# Patient Record
Sex: MALE | Race: WHITE
[De-identification: names, ages, dates, MRNs, and addresses within clinical notes are randomized per-mention and may not be internally consistent; named-entity substitution may affect disease eponyms.]

---

## 2023-10-12 ENCOUNTER — HOSPITAL ENCOUNTER (OUTPATIENT)
Dept: HOSPITAL 33 - ED | Age: 73
Setting detail: OBSERVATION
LOS: 1 days | Discharge: HOME | End: 2023-10-13
Attending: INTERNAL MEDICINE | Admitting: INTERNAL MEDICINE
Payer: MEDICARE

## 2023-10-12 DIAGNOSIS — N40.0: ICD-10-CM

## 2023-10-12 DIAGNOSIS — I25.10: ICD-10-CM

## 2023-10-12 DIAGNOSIS — K50.90: ICD-10-CM

## 2023-10-12 DIAGNOSIS — I10: ICD-10-CM

## 2023-10-12 DIAGNOSIS — Z20.828: ICD-10-CM

## 2023-10-12 DIAGNOSIS — E11.9: ICD-10-CM

## 2023-10-12 DIAGNOSIS — Z79.899: ICD-10-CM

## 2023-10-12 DIAGNOSIS — R10.9: ICD-10-CM

## 2023-10-12 DIAGNOSIS — E83.52: ICD-10-CM

## 2023-10-12 DIAGNOSIS — K56.609: Primary | ICD-10-CM

## 2023-10-12 DIAGNOSIS — I25.2: ICD-10-CM

## 2023-10-12 DIAGNOSIS — R74.01: ICD-10-CM

## 2023-10-12 LAB
ALBUMIN SERPL-MCNC: 4.5 G/DL (ref 3.5–5)
ALBUMIN SERPL-MCNC: 4.9 G/DL (ref 3.5–5)
ALP SERPL-CCNC: 59 U/L (ref 38–126)
ALP SERPL-CCNC: 69 U/L (ref 38–126)
ALT SERPL-CCNC: 57 U/L (ref 0–50)
ALT SERPL-CCNC: 68 U/L (ref 0–50)
ANION GAP SERPL CALC-SCNC: 14.4 MEQ/L (ref 5–15)
ANION GAP SERPL CALC-SCNC: 16 MEQ/L (ref 5–15)
AST SERPL QL: 48 U/L (ref 17–59)
AST SERPL QL: 78 U/L (ref 17–59)
BACTERIA UR CULT: NO
BASOPHILS # BLD AUTO: 0.03 X10^3/UL (ref 0–0.4)
BASOPHILS NFR BLD AUTO: 0.3 % (ref 0–0.4)
BILIRUB BLD-MCNC: 1.8 MG/DL (ref 0.2–1.3)
BILIRUB BLD-MCNC: 2 MG/DL (ref 0.2–1.3)
BUN SERPL-MCNC: 19 MG/DL (ref 9–20)
BUN SERPL-MCNC: 20 MG/DL (ref 9–20)
CALCIUM SPEC-MCNC: 10.5 MG/DL (ref 8.4–10.2)
CALCIUM SPEC-MCNC: 11.2 MG/DL (ref 8.4–10.2)
CHLORIDE SERPL-SCNC: 101 MMOL/L (ref 98–107)
CHLORIDE SERPL-SCNC: 101 MMOL/L (ref 98–107)
CO2 SERPL-SCNC: 26 MMOL/L (ref 22–30)
CO2 SERPL-SCNC: 29 MMOL/L (ref 22–30)
CREAT SERPL-MCNC: 0.75 MG/DL (ref 0.66–1.25)
CREAT SERPL-MCNC: 0.84 MG/DL (ref 0.66–1.25)
EOSINOPHIL # BLD AUTO: 0.01 X10^3/UL (ref 0–0.5)
GFR SERPLBLD BASED ON 1.73 SQ M-ARVRAT: > 60 ML/MIN
GFR SERPLBLD BASED ON 1.73 SQ M-ARVRAT: > 60 ML/MIN
GLUCOSE SERPL-MCNC: 152 MG/DL (ref 74–106)
GLUCOSE SERPL-MCNC: 198 MG/DL (ref 74–106)
GLUCOSE UR-MCNC: >=1000 MG/DL
HCT VFR BLD AUTO: 47.6 % (ref 42–50)
HCT VFR BLD AUTO: 49.6 % (ref 42–50)
HGB BLD-MCNC: 14.9 G/DL (ref 12.5–18)
HGB BLD-MCNC: 15.6 G/DL (ref 12.5–18)
IMM GRANULOCYTES # BLD: 0.05 X10^3U/L (ref 0–0.03)
IMM GRANULOCYTES NFR BLD: 0.5 % (ref 0–0.4)
LIPASE SERPL-CCNC: 60 U/L (ref 23–300)
LYMPHOCYTES # SPEC AUTO: 0.62 X10^3/UL (ref 1–4.6)
MCH RBC QN AUTO: 32.2 PG (ref 26–32)
MCH RBC QN AUTO: 32.4 PG (ref 26–32)
MCHC RBC AUTO-ENTMCNC: 31.3 G/DL (ref 32–36)
MCHC RBC AUTO-ENTMCNC: 31.5 G/DL (ref 32–36)
MONOCYTES # BLD AUTO: 0.74 X10^3/UL (ref 0–1.3)
NRBC # BLD AUTO: 0 X10^3U/L (ref 0–0.01)
NRBC BLD AUTO-RTO: 0 % (ref 0–0.1)
PLATELET # BLD AUTO: 260 X10^3/UL (ref 150–450)
PLATELET # BLD AUTO: 304 X10^3/UL (ref 150–450)
POTASSIUM SERPLBLD-SCNC: 4.3 MMOL/L (ref 3.5–5.1)
POTASSIUM SERPLBLD-SCNC: 4.3 MMOL/L (ref 3.5–5.1)
PROT SERPL-MCNC: 7.5 G/DL (ref 6.3–8.2)
PROT SERPL-MCNC: 8.1 G/DL (ref 6.3–8.2)
RBC # BLD AUTO: 4.6 X10^6/UL (ref 4.1–5.6)
RBC # BLD AUTO: 4.84 X10^6/UL (ref 4.1–5.6)
RBC # URNS HPF: (no result) /HPF (ref 0–5)
SODIUM SERPL-SCNC: 139 MMOL/L (ref 137–145)
SODIUM SERPL-SCNC: 141 MMOL/L (ref 137–145)
WBC # BLD AUTO: 10 X10^3/UL (ref 4–10.5)
WBC # BLD AUTO: 8.6 X10^3/UL (ref 4–10.5)
WBC URNS QL MICRO: (no result) /HPF (ref 0–5)

## 2023-10-12 PROCEDURE — 71045 X-RAY EXAM CHEST 1 VIEW: CPT

## 2023-10-12 PROCEDURE — 36415 COLL VENOUS BLD VENIPUNCTURE: CPT

## 2023-10-12 PROCEDURE — 82947 ASSAY GLUCOSE BLOOD QUANT: CPT

## 2023-10-12 PROCEDURE — 84484 ASSAY OF TROPONIN QUANT: CPT

## 2023-10-12 PROCEDURE — 83690 ASSAY OF LIPASE: CPT

## 2023-10-12 PROCEDURE — 99285 EMERGENCY DEPT VISIT HI MDM: CPT

## 2023-10-12 PROCEDURE — 83036 HEMOGLOBIN GLYCOSYLATED A1C: CPT

## 2023-10-12 PROCEDURE — 85027 COMPLETE CBC AUTOMATED: CPT

## 2023-10-12 PROCEDURE — 94762 N-INVAS EAR/PLS OXIMTRY CONT: CPT

## 2023-10-12 PROCEDURE — 74176 CT ABD & PELVIS W/O CONTRAST: CPT

## 2023-10-12 PROCEDURE — 36000 PLACE NEEDLE IN VEIN: CPT

## 2023-10-12 PROCEDURE — 96374 THER/PROPH/DIAG INJ IV PUSH: CPT

## 2023-10-12 PROCEDURE — 80053 COMPREHEN METABOLIC PANEL: CPT

## 2023-10-12 PROCEDURE — 85025 COMPLETE CBC W/AUTO DIFF WBC: CPT

## 2023-10-12 PROCEDURE — 93268 ECG RECORD/REVIEW: CPT

## 2023-10-12 PROCEDURE — 96375 TX/PRO/DX INJ NEW DRUG ADDON: CPT

## 2023-10-12 PROCEDURE — 81001 URINALYSIS AUTO W/SCOPE: CPT

## 2023-10-12 PROCEDURE — G0378 HOSPITAL OBSERVATION PER HR: HCPCS

## 2023-10-12 RX ADMIN — MORPHINE SULFATE PRN MG: 4 INJECTION, SOLUTION INTRAMUSCULAR; INTRAVENOUS at 14:46

## 2023-10-12 RX ADMIN — HYDROMORPHONE HYDROCHLORIDE PRN MG: 1 INJECTION, SOLUTION INTRAMUSCULAR; INTRAVENOUS; SUBCUTANEOUS at 22:40

## 2023-10-12 RX ADMIN — DEXTROSE AND SODIUM CHLORIDE SCH MLS/HR: 5; 450 INJECTION, SOLUTION INTRAVENOUS at 15:39

## 2023-10-12 RX ADMIN — PROCHLORPERAZINE EDISYLATE PRN MG: 5 INJECTION INTRAMUSCULAR; INTRAVENOUS at 17:24

## 2023-10-12 RX ADMIN — MORPHINE SULFATE PRN MG: 4 INJECTION, SOLUTION INTRAMUSCULAR; INTRAVENOUS at 10:40

## 2023-10-12 RX ADMIN — DEXTROSE AND SODIUM CHLORIDE SCH MLS/HR: 5; 450 INJECTION, SOLUTION INTRAVENOUS at 06:30

## 2023-10-12 RX ADMIN — MORPHINE SULFATE PRN MG: 4 INJECTION, SOLUTION INTRAMUSCULAR; INTRAVENOUS at 06:27

## 2023-10-12 RX ADMIN — HYDROMORPHONE HYDROCHLORIDE PRN MG: 1 INJECTION, SOLUTION INTRAMUSCULAR; INTRAVENOUS; SUBCUTANEOUS at 17:24

## 2023-10-12 NOTE — XRAY
CLINICAL HISTORY:pain

COMPARISON:None

TECHNIQUE:Multiple axial sections of CT scan of the abdomen and pelvis were

carried out without non-ionic IV contrast enhancement. Coronal and Sagittal

reformations were also acquired.Total .7mGy-cm CTDl 16.9mGY

FINDINGS:

The jejunal and ileal loops appear dilated maximum [id number] caliber of one

loop measures 3.9cm. The dilated loops appear fecal loaded giving small bowel

feces signs.

There is a possible abrupt transition noted in the distal ileal loops in the

right lower abdomen. There is mild adjacent mesenteric fat stranding along

with increased vascularity noted.

No free fluid seen

No evidence of pneumoperitoneum.

The visulaized bowel loop appears unremarkable. Surgical sutures in the bowel

loops in the right lower quadrant.



Overall imaging findings are Suggestive of acute/ subacute intestinal

obstruction.





Liver is normal in size. No focal mass seen. Intra and extra-hepatic biliary

ducts are not dilated. Few calcified Granuloma noted in the Caudate lobe

Gallbladder appears normal. No evidence of radio-opaque calculus, wall

thickening or pericholecystic fluid.

The spleen appears normal. No evidence of focal mass was seen.

Pancreas appears normal. No evidence of mass lesion was noted.

Both adrenal glands appear normal. No discrete lesion was noted.

Both kidneys are normal in size. No evidence of focal mass, calculus or

hydronephrosis was noted.

Exophytic simple cortical cyst noted in both kidneys, on the right side it

measures 3.8 x 2.9cm and on left it measures 1.3 x 0.8cm. Possible parapelvic

cysts in the left kidney.

The urinary bladder is smooth in outline, without evidence of wall thickening,

vesical calculus or intraluminal mass.

Pelvic viscera appear normal.



No significant lymphadenopathy was seen.

Atheromatous changes noted in aorta and its branches

On appropriate lung window settings, There is ground-glass haze with prominent

reticulation noted in the right lung base

On bone window settings, Degenerative changes noted in the spine With

multilevel osteophytes and vacuum phenomena

IMPRESSION:

1. Overall imaging findings are suggestive of acute/subacute small bowel

obstruction with a possible transition point noted in the distal ileal loops

in the right lower abdomen associated with mild adjacent mesenteric fat

extending and increased vascularity. Would recommend clinical correlation

2. No evidence of ascites or pneumoperitoneum.

The [facility] ER office was called at [phone] at

03:19 AM CST, 10/12/2023 and the results were verbally communicated with Sharifa

_____________________________________

Electronically Signed by: Dasia Wallace MD. (10/12/2023 03:28:05 CST)

## 2023-10-12 NOTE — ERPHSYRPT
- History of Present Illness


Time Seen by Provider: 10/12/23 03:10


Historian: patient


Exam Limitations: no limitations


Patient Subjective Stated Complaint: pt states crohns flare up


Triage Nursing Assessment: pt ambulated into the er; pt is axo x4; c/o abd pain;

pt is yelling out; pt is holding abd; garding abd; tenderness to abd; abd is 

round, distended; active bowel sounds in all quads; last BM 10/11/23; c/o 

vomiting; skin PDW; no respiratory distress; hypertension


Physician History: 


Patient is a 73-year-old male presents to our emergency department for 

evaluation of abdominal pain.  Patient has a history of Crohn's colitis and 

feels that he is currently experiencing a flareup.  Symptoms started yesterday 

at approximately 3 PM.  Patient states his symptoms have been progressive.  

Patient thought the pain would eventually go away however symptoms worsen.  

Patient states he mowed his lawn today which he feels acutely worsened patient's

symptoms.  Patient tried eating however he vomited up his meal.  Patient states 

he cannot tolerate p.o.  He had 1 regular bowel movement yesterday.  Patient's 

pain described as an ache and is localized to the left lower quadrant.  

Palpation reproduces symptoms.  No trauma.  No fever.  No diarrhea.  





Patient's GI doctor is Dr. Grover from [place].  Patient voices no other 

complaints or concerns at this time.











Portions of this note were created with voice recognition technology.  There may

be grammatical, spelling, punctuation or sound alike errors





Timing/Duration: yesterday


Activities at Onset: none


Quality: aching


Abdominal Pain Onset Location: generalized abdomen


Pain Radiation: no radiation


Severity of Pain-Max: moderate


Severity of Pain-Current: mild


Modifying Factors: Improves With: palpation


Associated Symptoms: vomiting


Previous symptoms: same symptoms as today (Symptoms today are similar to prev

ious symptoms of Crohn's colitis flareup)


Allergies/Adverse Reactions: 








No Known Drug Allergies Allergy (Verified 10/12/23 02:46)


   





Home Medications: 








Amlodipine Besylate 5 mg*** [Norvasc 5 mg***] 5 mg PO BID 02/24/17 [History]


Aspirin [Aspir-Low] 81 mg PO DAILY 02/24/17 [History]


Cholecalciferol (Vitamin D3) [Vitamin D] 400 unit PO DAILY 02/24/17 [History]


Clopidogrel Bisulfate [PLAVIX 75 MG Tablet***] 75 mg PO DAILY 02/24/17 [History]


Insulin Lispro [Humalog] 20 unit SQ BID 02/24/17 [History]


Magnesium Oxide 400 mg*** [Mag-Ox 400***] 400 mg PO TID 02/24/17 [History]


Metformin HCl [Glucophage] 1,000 mg PO BID 02/24/17 [History]


Metoprolol Tartrate 50 mg*** [Lopressor 50 MG***] 50 mg PO BID 02/24/17 

[History]


Terazosin HCl 5 mg PO HS 02/24/17 [History]


azaTHIOprine [Azathioprine] 100 mg PO BID 02/24/17 [History]


hydroCHLOROthiazide [Hydrochlorothiazide] 12.5 mg PO DAILY 02/24/17 [History]


Atorvastatin Calcium [Lipitor 20MG Tablet] 20 mg PO DAILY 10/12/23 [History]


Canagliflozin [Invokana] 300 mg PO DAILY 10/12/23 [History]


Nitroglycerin 0.4 mg Tablet*** [Nitrostat 0.4 MG Tablet***] 0.4 mg SL UD PRN 

10/12/23 [History]


PANTOPRAZOLE 40 mg Tablet*** [Protonix 40MG Tablet***] 40 mg PO DAILY 10/12/23 

[History]


Pregabalin 150 mg PO BID 10/12/23 [History]


Tamsulosin HCl 0.4 mg*** [Flomax 0.4 MG***] 0.4 mg PO DAILY 10/12/23 [History]


glipiZIDE [Glipizide] 5 mg PO DAILY 10/12/23 [History]


lisinopriL [Lisinopril] 40 mg PO BID 10/12/23 [History]





Hx Tetanus, Diphtheria Vaccination/Date Given: No


Hx Influenza Vaccination/Date Given: Yes


Hx Pneumococcal Vaccination/Date Given: No


Immunizations Up to Date: Yes





Travel Risk





- International Travel


Have you traveled outside of the country in past 3 weeks: No





- Coronavirus Screening


Are you exhibiting any of the following symptoms?: No


Close contact with a COVID-19 positive Pt in past 14-21 Days: No





- Vaccine Status


Have you recieved a Covid-19 vaccination: No


: Moderna





- Vaccination Dates


Date of 2cond Vaccination (if applicable): 2021





- Review of Systems


Constitutional: No Symptoms, No Fever, No Chills


Eyes: No Symptoms


Ears, Nose, & Throat: No Symptoms


Respiratory: No Symptoms, No Cough, No Dyspnea


Cardiac: No Symptoms, No Chest Pain, No Edema, No Syncope


Abdominal/Gastrointestinal: No Symptoms, No Abdominal Pain, No Nausea, No 

Vomiting, No Diarrhea


Genitourinary Symptoms: No Symptoms, No Dysuria


Musculoskeletal: No Symptoms, No Back Pain, No Neck Pain


Skin: No Symptoms, No Rash


Neurological: No Symptoms, No Dizziness, No Focal Weakness, No Sensory Changes


Psychological: No Symptoms


Endocrine: No Symptoms


Hematologic/Lymphatic: No Symptoms


Immunological/Allergic: No Symptoms


All Other Systems: Reviewed and Negative





- Past Medical History


Pertinent Past Medical History: No


Neurological History: No Pertinent History


ENT History: Cataracts, Glaucoma


Cardiac History: Coronary Artery Disease, Hypertension, Myocardial Infarction 

(MI)


Respiratory History: No Pertinent History


Endocrine Medical History: Diabetes Type II


Musculoskeletal History: Osteoarthritis


GI Medical History: Crohns Disease


 History: No Pertinent History


Psycho-Social History: No Pertinent History


Male Reproductive Disorders: No Pertinent History





- Past Surgical History


Past Surgical History: Yes


Neuro Surgical History: No Pertinent History


Cardiac: Cardiac Catheterization, Cardiac Stent


Respiratory: No Pertinent History


Gastrointestinal: Colon Resection


Genitourinary: No Pertinent History


Musculoskeletal: No Pertinent History


Male Surgical History: No Pertinent History





- Social History


Smoking Status: Former smoker


Exposure to second hand smoke: No


Drug Use: none


Patient Lives Alone: Yes





- Nursing Vital Signs


Nursing Vital Signs: 


                               Initial Vital Signs











Pulse Rate  99 H  10/12/23 02:48


 


Blood Pressure  154/93   10/12/23 02:48


 


O2 Sat by Pulse Oximetry  95   10/12/23 02:48








                                   Pain Scale











Pain Intensity                 5

















- Physical Exam


General Appearance: no apparent distress, alert


Eye Exam: PERRL/EOMI, eyes nml inspection


Ears, Nose, Throat Exam: normal ENT inspection, pharynx normal, moist mucous 

membranes


Neck Exam: normal inspection, non-tender, supple, full range of motion


Respiratory Exam: normal breath sounds, lungs clear, airway intact, No 

respiratory distress


Cardiovascular Exam: regular rate/rhythm, normal heart sounds


Gastrointestinal/Abdomen Exam: soft, tenderness (Diffuse tenderness more so at 

the left lower quadrant), distention (Abdomen is distended.  Overlying soft 

tissue intact.), No mass


Back Exam: normal inspection, normal range of motion, No CVA tenderness, No 

vertebral tenderness


Extremity Exam: normal inspection, normal range of motion, pelvis stable


Neurologic Exam: alert, oriented x 3, cooperative, normal mood/affect, nml cereb

ellar function, sensation nml, No motor deficits


Skin Exam: normal color, warm, dry


Lymphatic Exam: No adenopathy


**SpO2 Interpretation**: normal


SpO2: 95


O2 Delivery: Room Air





- Course


Nursing assessment & vital signs reviewed: Yes





- CT Exams


  ** Abdomen/Pelvis


CT Interpretation: Tele-radiologist Report (Acute/subacute small bowel 

obstruction with possible transition point noted in the distal ileal loops in 

the right lower quadrant associated with mild adjacent mesenteric fat extending 

and increased vascularity.)


Ordered Tests: 


                               Active Orders 24 hr











 Category Date Time Status


 


 IV Insertion STAT Care  10/12/23 03:07 Active


 


 NGT [Gastric Tube Insertion] STAT Care  10/12/23 04:37 Active


 


 ABDOMEN AND PELVIS W/0 CONTRAS [CT] Stat Exams  10/12/23 03:08 Completed


 


 CBC W DIFF Stat Lab  10/12/23 03:15 Completed


 


 CMP Stat Lab  10/12/23 03:15 Completed


 


 LIPASE Stat Lab  10/12/23 03:15 Completed


 


 TROPONIN Q4H Lab  10/12/23 03:15 Completed


 


 TROPONIN Q4H Lab  10/12/23 07:15 Ordered


 


 TROPONIN Q4H Lab  10/12/23 11:15 Ordered


 


 UA W/RFX UR CULTURE Stat Lab  10/12/23 03:07 Ordered








Medication Summary











Generic Name Dose Route Start Last Admin





  Trade Name Freq  PRN Reason Stop Dose Admin


 


Sodium Chloride  1,000 mls @ 100 mls/hr  10/12/23 03:15  10/12/23 03:32





  Sodium Chloride 0.9% 1000 Ml  IV  11/11/23 03:14  100 mls/hr





  .Q10H YARED   Administration














Discontinued Medications














Generic Name Dose Route Start Last Admin





  Trade Name Freq  PRN Reason Stop Dose Admin


 


Morphine Sulfate  4 mg  10/12/23 03:07  10/12/23 03:32





  Morphine Sulfate 4 Mg/Ml Injection  IV  10/12/23 03:08  4 mg





  STAT ONE   Administration


 


Morphine Sulfate  Confirm  10/12/23 03:16 





  Morphine Sulfate 4 Mg/Ml Injection  Administered  10/12/23 03:17 





  Dose  





  4 mg  





  .ROUTE  





  .STK-MED ONE  


 


Ondansetron HCl  4 mg  10/12/23 03:07  10/12/23 03:32





  Ondansetron Hcl 4 Mg/2 Ml Vial  IV  10/12/23 03:08  4 mg





  STAT ONE   Administration


 


Ondansetron HCl  Confirm  10/12/23 03:16 





  Ondansetron Hcl 4 Mg/2 Ml Vial  Administered  10/12/23 03:17 





  Dose  





  4 mg  





  .ROUTE  





  .STK-MED ONE  











Lab/Rad Data: 


                           Laboratory Result Diagrams





                                 10/12/23 03:15 





                                 10/12/23 03:15 





                               Laboratory Results











  10/12/23 10/12/23 10/12/23 Range/Units





  03:15 03:15 03:15 


 


WBC    10.0  (4.0-10.5)  x10^3/uL


 


RBC    4.84  (4.1-5.6)  x10^6/uL


 


Hgb    15.6  (12.5-18.0)  g/dL


 


Hct    49.6  (42-50)  %


 


MCV    102.5 H  ()  fL


 


MCH    32.2 H  (26-32)  pg


 


MCHC    31.5 L  (32-36)  g/dL


 


RDW    14.1 H  (11.5-14.0)  %


 


Plt Count    304  (150-450)  x10^3/uL


 


MPV    9.5  (7.5-11.0)  fL


 


Gran %    85.5 H  (36.0-66.0)  %


 


Immature Gran % (Auto)    0.5 H  (0.00-0.4)  %


 


Nucleat RBC Rel Count    0.0  (0.00-0.1)  %


 


Eos # (Auto)    0.01  (0-0.5)  x10^3/uL


 


Immature Gran # (Auto)    0.05 H  (0.00-0.03)  x10^3u/L


 


Absolute Lymphs (auto)    0.62 L  (1.0-4.6)  x10^3/uL


 


Absolute Monos (auto)    0.74  (0.0-1.3)  x10^3/uL


 


Absolute Nucleated RBC    0.00  (0.00-0.01)  x10^3u/L


 


Lymphocytes %    6.2 L  (24.0-44.0)  %


 


Monocytes %    7.4  (0.0-12.0)  %


 


Eosinophils %    0.1  (0.00-5.0)  %


 


Basophils %    0.3  (0.0-0.4)  %


 


Absolute Granulocytes    8.53 H  (1.4-6.9)  x10^3/uL


 


Basophils #    0.03  (0-0.4)  x10^3/uL


 


Sodium   139   (137-145)  mmol/L


 


Potassium   4.3   (3.5-5.1)  mmol/L


 


Chloride   101   ()  mmol/L


 


Carbon Dioxide   26   (22-30)  mmol/L


 


Anion Gap   16.0 H   (5-15)  MEQ/L


 


BUN   19   (9-20)  mg/dL


 


Creatinine   0.84   (0.66-1.25)  mg/dL


 


Estimated GFR   > 60.0   ML/MIN


 


Glucose   198 H   ()  mg/dL


 


Calcium   11.2 H   (8.4-10.2)  mg/dL


 


Total Bilirubin   1.80 H   (0.2-1.3)  mg/dL


 


AST   78 H   (17-59)  U/L


 


ALT   68 H   (0-50)  U/L


 


Alkaline Phosphatase   69   ()  U/L


 


Troponin I  < 0.012    (0.000-0.034)  ng/mL


 


Serum Total Protein   8.1   (6.3-8.2)  g/dL


 


Albumin   4.9   (3.5-5.0)  g/dL


 


Lipase   60   ()  U/L














- Progress


Progress: improved


Progress Note: 


Case discussed with Dr. Rivera general surgeon on-call who advises admission.  

He will see patient and the hospital.  Conversation took place at 4:43 AM.  Plan

 of care discussed with patient.  He agrees to admission at [facility] for further evaluation and treatment.





Portions of this note were created with voice recognition technology.  There may

 be grammatical, spelling, punctuation or sound alike errors











10/12/23 04:43


Patient is a 73-year-old male with a history of diabetes, coronary artery 

disease, hypertension cardiac sten, Crohn's disease and small bowel resection 

presents to our ED for evaluation of 1 day history of progressive abdominal 

distention and pain.  1 bout of nonbloody nonbilious emesis.  Physical exam 

reveals diffuse abdominal distention and tenderness however more so in the left 

lower quadrant.  CT scan reveals a small bowel obstruction with a transition 

point at the right distal ileum.  There is associated mesenteric stranding at 

this area.  CBC CMP otherwise nonremarkable.  No leukocytosis.  Lipase within 

normal limits.  Troponin negative.  Patient received morphine for pain control 

and Zofran for nausea.  Patient is currently NPO.  Liter of normal saline 

administered.  Patient reassessed.  He is resting comfortably.  Case discussed 

with  of general surgery who accepts consultation.  Case to be 

discussed with hospitalist Dr. Carlin.  Plan of care discussed with patient.  NG

 tube placed to low intermittent suction.  Patient agrees to admission to [facility] for further evaluation and treatment.








Portions of this note were created with voice recognition technology.  There may

 be grammatical, spelling, punctuation or sound alike errors





Complexity of problems addressed is high.  Severe exacerbation of chronic 

condition.  This condition is a threat to bodily function.





No critical care time





Complexity of data reviewed and analyzed is extensive.  Test ordered test 

reviewed.  Results analyzed.  Clinical correlation made between the findings of 

laboratory studies/ imaging studies versus history and physical examination.  

Case and management discussed with on-call general surgeon and hospitalist





Risk complication and or risk morbidity/mortality patient management is high.  

Patient received IV controlled pain medication/morphine.  Patient will require 

hospitalization for further evaluation and treatment.





Vital stable.  Diagnosis is small bowel obstruction.  Time spent to admit 

patient approximately 15 minutes.  Plan of care established for shared decision 

making.  Patient declined additional pain medication.  He voices no other 

complaints or concerns at this time.








Portions of this note were created with voice recognition technology.  There may

 be grammatical, spelling, punctuation or sound alike errors








10/12/23 04:51


Case discussed with Dr. Carlin at 5 AM.  Dr. Carlin accepts admission to 

observation.


10/12/23 05:00





Discussed with Dr.: Mathieu Mcnair


Will see patient in: hospital (observation)


Counseled pt/family regarding: lab results, diagnosis





- Departure


Departure Disposition: Observation


Clinical Impression: 


 Small bowel obstruction, Bilateral renal exophytic cyst, Abdominal pain





Condition: Stable


Critical Care Time: No


Referrals: 


KARMA LYN [Primary Care Provider] - Follow up/PCP as directed

## 2023-10-12 NOTE — XRAY
Indication: NG tube placement.



Comparison: None



Portable chest demonstrates NG tube tip in stomach.  Lungs demonstrate minimal

bibasilar subsegmental atelectasis/scarring.  Remaining heart and lungs

unremarkable.  Bony thorax intact with osteopenia and mild degenerative

changes.

## 2023-10-12 NOTE — PCM.HP
History of Present Illness





- Chief Complaint


Chief Complaint: Small bowel obstruction, abdominal pain


History of Present Illness: 


 is a 73 year old male with hx of Crohn's, HTN, DMII, BPH, HLP presented

to ER with c/o severe left lower abd pain started yesterday. Pain is associated 

with nausea and vomiting. He had 1 BM since yesterday, and now unable to 

tolerate any oral foods/meds.





In ER, CT scan shows SBO, and now admitted for this. Has NGT and NPO. General 

surgery has been consulted by ER and will see pt in AM





He has had SBOs in the past - last one 5 yrs ago - and they both resolved on 

their own.





Denies fever. No bloody stools.





- Review of Systems


Constitutional: No Symptoms


Eyes: No Symptoms


Ears, Nose, & Throat: No Symptoms


Respiratory: No Symptoms


Cardiac: No Symptoms


Abdominal/Gastrointestinal: Abdominal Pain, Nausea, Vomiting


Genitourinary Symptoms: No Symptoms


Musculoskeletal: No Symptoms


Skin: No Symptoms


Neurological: No Symptoms


Psychological: No Symptoms


Endocrine: No Symptoms


Hematologic/Lymphatic: No Symptoms


Immunological/Allergic: No Symptoms





Medications & Allergies


Home Medications: 


                              Home Medication List





Amlodipine Besylate 5 mg*** [Norvasc 5 mg***] 5 mg PO BID 02/24/17 [History 

Confirmed 10/12/23]


Aspirin [Aspir-Low] 81 mg PO DAILY 02/24/17 [History Confirmed 10/12/23]


Cholecalciferol (Vitamin D3) [Vitamin D] 400 unit PO DAILY 02/24/17 [History 

Confirmed 10/12/23]


Clopidogrel Bisulfate [PLAVIX 75 MG Tablet***] 75 mg PO DAILY 02/24/17 [History 

Confirmed 10/12/23]


Insulin Lispro [Humalog] 20 unit SQ BID 02/24/17 [History Confirmed 10/12/23]


Magnesium Oxide 400 mg*** [Mag-Ox 400***] 400 mg PO TID 02/24/17 [History 

Confirmed 10/12/23]


Metformin HCl [Glucophage] 1,000 mg PO BID 02/24/17 [History Confirmed 10/12/23]


Metoprolol Tartrate 50 mg*** [Lopressor 50 MG***] 50 mg PO BID 02/24/17 [History

 Confirmed 10/12/23]


Terazosin HCl 5 mg PO HS 02/24/17 [History Confirmed 10/12/23]


azaTHIOprine [Azathioprine] 100 mg PO BID 02/24/17 [History Confirmed 10/12/23]


hydroCHLOROthiazide [Hydrochlorothiazide] 12.5 mg PO DAILY 02/24/17 [History 

Confirmed 10/12/23]


Atorvastatin Calcium [Lipitor 20MG Tablet] 20 mg PO DAILY 10/12/23 [History 

Confirmed 10/12/23]


Canagliflozin [Invokana] 300 mg PO DAILY 10/12/23 [History Confirmed 10/12/23]


Insulin Degludec [Tresiba Flextouch U-100] 65 unit SQ HS 10/12/23 [History 

Confirmed 10/12/23]


Nitroglycerin 0.4 mg Tablet*** [Nitrostat 0.4 MG Tablet***] 0.4 mg SL UD PRN 

10/12/23 [History Confirmed 10/12/23]


PANTOPRAZOLE 40 mg Tablet*** [Protonix 40MG Tablet***] 40 mg PO DAILY 10/12/23 

[History Confirmed 10/12/23]


Pregabalin 150 mg PO BID 10/12/23 [History Confirmed 10/12/23]


Tamsulosin HCl 0.4 mg*** [Flomax 0.4 MG***] 0.4 mg PO BID 10/12/23 [History 

Confirmed 10/12/23]


glipiZIDE [Glipizide] 5 mg PO DAILY 10/12/23 [History Confirmed 10/12/23]


lisinopriL [Lisinopril] 40 mg PO BID 10/12/23 [History Confirmed 10/12/23]








Allergies/Adverse Reactions: 


                                    Allergies











Allergy/AdvReac Type Severity Reaction Status Date / Time


 


No Known Drug Allergies Allergy   Verified 10/12/23 02:46














- Past Medical History


Past Medical History: Yes


Neurological History: No Pertinent History


ENT History: Cataracts, Glaucoma


Cardiac History: Coronary Artery Disease, Hypertension, Myocardial Infarction 

(MI)


Respiratory History: No Pertinent History


Endocrine Medical History: Diabetes Type II


Musculoskelatal History: Osteoarthritis


GI Medical History: Crohns Disease, GERD


 History: No Pertinent History


Pyscho-Social History: No Pertinent History


Male Reproductive Disorders: Prostate Problems





- Past Surgical History


Past Surgical History: Yes


Neuro Surgical History: No Pertinent History


Cardiac History: Cardiac Catheterization, Cardiac Stent


Respiratory Surgery: No Pertinent History


GI Surgical History: Colon Resection


Genitourinary Surgical Hx: No Pertinent History


Musculskeletal Surgical Hx: No Pertinent History


Male Surgical History: No Pertinent History





- Social History


Smoking Status: Former smoker


Exposure to second hand smoke: No


Alcohol: Occasionally


Drug Use: none


Significant Family History: no pertinent family hx





- Physical Exam


Vital Signs: 


                               Vital Signs - 24 hr











  Pulse BP Pulse Ox


 


 10/12/23 05:01    95


 


 10/12/23 05:00  94 H  111/69  96


 


 10/12/23 04:30  94 H  123/77  92 L


 


 10/12/23 04:01   107/71  92 L


 


 10/12/23 03:31  98 H  122/89  92 L


 


 10/12/23 03:00   130/88  94 L


 


 10/12/23 02:57   147/94  95


 


 10/12/23 02:48  99 H  154/93  95











General Appearance: mild distress


Neurologic Exam: alert, oriented x 3, cooperative


Eye Exam: PERRL/EOMI, eyes nml inspection


Ears, Nose, Throat Exam: normal ENT inspection


Neck Exam: normal inspection, non-tender, supple


Respiratory Exam: normal breath sounds, lungs clear, airway intact


Cardiovascular Exam: regular rate/rhythm, normal heart sounds


Gastrointestinal/Abdomen Exam: tenderness, distention, guarding


Rectal Exam: deferred


Back Exam: normal inspection, normal range of motion


Extremity Exam: normal inspection


Skin Exam: normal color, warm, dry





Results





- Labs


Lab/Micro Results: 


                            Lab Results-Last 24 Hours











  10/12/23 10/12/23 10/12/23 Range/Units





  03:15 03:15 03:15 


 


WBC  10.0    (4.0-10.5)  x10^3/uL


 


RBC  4.84    (4.1-5.6)  x10^6/uL


 


Hgb  15.6    (12.5-18.0)  g/dL


 


Hct  49.6    (42-50)  %


 


MCV  102.5 H    ()  fL


 


MCH  32.2 H    (26-32)  pg


 


MCHC  31.5 L    (32-36)  g/dL


 


RDW  14.1 H    (11.5-14.0)  %


 


Plt Count  304    (150-450)  x10^3/uL


 


MPV  9.5    (7.5-11.0)  fL


 


Gran %  85.5 H    (36.0-66.0)  %


 


Immature Gran % (Auto)  0.5 H    (0.00-0.4)  %


 


Nucleat RBC Rel Count  0.0    (0.00-0.1)  %


 


Eos # (Auto)  0.01    (0-0.5)  x10^3/uL


 


Immature Gran # (Auto)  0.05 H    (0.00-0.03)  x10^3u/L


 


Absolute Lymphs (auto)  0.62 L    (1.0-4.6)  x10^3/uL


 


Absolute Monos (auto)  0.74    (0.0-1.3)  x10^3/uL


 


Absolute Nucleated RBC  0.00    (0.00-0.01)  x10^3u/L


 


Lymphocytes %  6.2 L    (24.0-44.0)  %


 


Monocytes %  7.4    (0.0-12.0)  %


 


Eosinophils %  0.1    (0.00-5.0)  %


 


Basophils %  0.3    (0.0-0.4)  %


 


Absolute Granulocytes  8.53 H    (1.4-6.9)  x10^3/uL


 


Basophils #  0.03    (0-0.4)  x10^3/uL


 


Sodium   139   (137-145)  mmol/L


 


Potassium   4.3   (3.5-5.1)  mmol/L


 


Chloride   101   ()  mmol/L


 


Carbon Dioxide   26   (22-30)  mmol/L


 


Anion Gap   16.0 H   (5-15)  MEQ/L


 


BUN   19   (9-20)  mg/dL


 


Creatinine   0.84   (0.66-1.25)  mg/dL


 


Estimated GFR   > 60.0   ML/MIN


 


Glucose   198 H   ()  mg/dL


 


Calcium   11.2 H   (8.4-10.2)  mg/dL


 


Total Bilirubin   1.80 H   (0.2-1.3)  mg/dL


 


AST   78 H   (17-59)  U/L


 


ALT   68 H   (0-50)  U/L


 


Alkaline Phosphatase   69   ()  U/L


 


Troponin I    < 0.012  (0.000-0.034)  ng/mL


 


Serum Total Protein   8.1   (6.3-8.2)  g/dL


 


Albumin   4.9   (3.5-5.0)  g/dL


 


Lipase   60   ()  U/L














- Radiology Impressions


Radiology Exams & Impressions: 


                              Radiology Procedures











 Category Date Time Status


 


 ABDOMEN AND PELVIS W/0 CONTRAS [CT] Stat Exams  10/12/23 03:08 Completed


 


 CHEST 1 VIEW (PORTABLE) Stat Exams  10/12/23 05:00 Taken














Assessment/Plan


(1) Small bowel obstruction


Current Visit: Yes   Status: Acute   


Assessment & Plan: 


SBO noted on CT scan. NPO and NGT to suction. Surgery to see. Prn morphine for 

pain


Code(s): K56.609 - UNSP INTESTNL OBST, UNSP AS TO PARTIAL VERSUS COMPLETE OBST  

 





(2) Crohn disease


Current Visit: Yes   Status: Acute   


Assessment & Plan: 


Denies any recent bloody stools. Now here with SBO - managing that


Code(s): K50.90 - CROHN'S DISEASE, UNSPECIFIED, WITHOUT COMPLICATIONS   





(3) Hypercalcemia


Current Visit: Yes   Status: Acute   


Assessment & Plan: 


Ca 11.2, dehydration and thiaizide, + Vit D. Holding all meds tonight due to NPO

 status. Will monitor calcium on IVF. If this remains a concern, then will need 

to hold Vit D and thiazide in longer term


Code(s): E83.52 - HYPERCALCEMIA   





(4) Transaminitis


Current Visit: Yes   Status: Acute   


Assessment & Plan: 


Bili 1.8, AST 78, ALT 68, AP is 69. CT scan mentioned "normal liver". No stone 

or CBD dilatation was mentioned. Likely unrelated to SBO. We will monitor this 

closely and f/up as needed





He went to a wine festival recently. But he normally does not drink alcohol 

regularly


Code(s): R74.01 - ELEVATION OF LEVELS OF LIVER TRANSAMINASE LEVELS   





(5) Essential (primary) hypertension


Current Visit: Yes   Status: Acute   


Assessment & Plan: 


BP is not high. Holding oral meds due to NPO status. If higher, can use prn IV 

BP meds


Code(s): I10 - ESSENTIAL (PRIMARY) HYPERTENSION   





(6) Diabetes mellitus, type II


Current Visit: Yes   Status: Acute   


Assessment & Plan: 


NPO. Starting D5 1/2 NS at 100ml/hr. Accucheck and low dose SSI.








Telemedicine Encounter





- Telemedicine Encounter


Telemedicine Encounter: 


The entirety of this encounter was performed via Telemedicine" 


Pt gace me verbal consent to have this telemedicine visit

## 2023-10-12 NOTE — CONS
CONSULT DATE:        10/12/2023



HISTORY OF PRESENT ILLNESS:  Patient is a 73 year-old male who presented yesterday with 
complaints of abdominal pain, nausea, and vomiting. Patient has a history of Crohn's 
disease. He takes azathioprine bid. His gastroenterologist is Dr. Rapp. Reports that he 
had not had a colonoscopy for 2-3 years now. He states his last bowel obstruction was 
about 6 or 7 years ago. He does have history of abdominal surgery X 1 for an obstruction. 
It appears that he had surgical changes in the right colon area. He does complain of a 
little discomfort today. He is not overly distended or tender. He is soft. He does have a 
CT scan with some dilated loops in his distal ileum. He is not passing any flatus today. 
He is insisting that his NG tube be removed as it is uncomfortable. 



PAST MEDICAL HISTORY:  Crohn's disease, hypertension, hyperlipidemia, coronary artery 
disease, diabetes, benign prostatic hypertrophy, gastroesophageal reflux disease.



CURRENT MEDICATIONS:  Per chart.



ALLERGIES:  NKDA.



PAST SURGERIES:  Abdominal surgery X 1 for past obstruction, cardiac stents.



SOCIAL HISTORY:  None reported.

FAMILY HISTORY:  None reported.



REVIEW OF SYSTEMS:  

CONSTITUTIONAL:  Denies fever or chills.

CHEST:  Denies chest pain.

CARDIAC:  Denies tachycardia.

ABDOMEN:  Reports abdominal pain. Denies nausea or vomiting. Denies flatus.



PHYSICAL EXAMINATION:  

GENERAL:  No acute distress.

CHEST:  Nonlabored. No shortness of breath.

CVS:  Regular rate and rhythm.

ABDOMEN:  Soft. Mildly tender right side, round.



IMPRESSION:  

1.  73 YEAR-OLD MALE WITH HISTORY OF CROHN'S DISEASE ESTABLISHED WITH GASTROINTESTINAL AND 
CURRENTLY COMPLIANT WITH MAINTENANCE MEDICINES. CT scan reviewed - Dilated loops of his 
distal ileum. He is not overly tender today. He is not passing gas. We will keep him NPO 
and NG tube in place. Will follow daily. If he does not progress, will consider small 
bowel follow through. He is due for a colonoscopy as well. We will continue to monitor 
him. He is a Crohn's patient and does have a cardiac history. If there was a need for 
surgery, we could likely consider transferring this patient to where there is cardiac and 
gastroenterology services.



This report was dictated for Dr. Lares by Margarette Cardenas NP.

## 2023-10-13 VITALS
DIASTOLIC BLOOD PRESSURE: 74 MMHG | SYSTOLIC BLOOD PRESSURE: 157 MMHG | TEMPERATURE: 98.7 F | RESPIRATION RATE: 18 BRPM | HEART RATE: 89 BPM | OXYGEN SATURATION: 96 %

## 2023-10-13 LAB
ALBUMIN SERPL-MCNC: 4.2 G/DL (ref 3.5–5)
ALP SERPL-CCNC: 63 U/L (ref 38–126)
ALT SERPL-CCNC: 46 U/L (ref 0–50)
ANION GAP SERPL CALC-SCNC: 12.3 MEQ/L (ref 5–15)
AST SERPL QL: 37 U/L (ref 17–59)
BILIRUB BLD-MCNC: 2.3 MG/DL (ref 0.2–1.3)
BUN SERPL-MCNC: 18 MG/DL (ref 9–20)
CALCIUM SPEC-MCNC: 9.9 MG/DL (ref 8.4–10.2)
CHLORIDE SERPL-SCNC: 100 MMOL/L (ref 98–107)
CO2 SERPL-SCNC: 30 MMOL/L (ref 22–30)
CREAT SERPL-MCNC: 0.78 MG/DL (ref 0.66–1.25)
GFR SERPLBLD BASED ON 1.73 SQ M-ARVRAT: > 60 ML/MIN
GLUCOSE SERPL-MCNC: 161 MG/DL (ref 74–106)
HCT VFR BLD AUTO: 47.1 % (ref 42–50)
HGB BLD-MCNC: 14.6 G/DL (ref 12.5–18)
MCH RBC QN AUTO: 32.4 PG (ref 26–32)
MCHC RBC AUTO-ENTMCNC: 31 G/DL (ref 32–36)
PLATELET # BLD AUTO: 265 X10^3/UL (ref 150–450)
POTASSIUM SERPLBLD-SCNC: 4.3 MMOL/L (ref 3.5–5.1)
PROT SERPL-MCNC: 7 G/DL (ref 6.3–8.2)
RBC # BLD AUTO: 4.51 X10^6/UL (ref 4.1–5.6)
SODIUM SERPL-SCNC: 138 MMOL/L (ref 137–145)
WBC # BLD AUTO: 7.1 X10^3/UL (ref 4–10.5)

## 2023-10-13 RX ADMIN — HYDROMORPHONE HYDROCHLORIDE PRN MG: 1 INJECTION, SOLUTION INTRAMUSCULAR; INTRAVENOUS; SUBCUTANEOUS at 07:35

## 2023-10-13 RX ADMIN — HYDROMORPHONE HYDROCHLORIDE PRN MG: 1 INJECTION, SOLUTION INTRAMUSCULAR; INTRAVENOUS; SUBCUTANEOUS at 03:20

## 2023-10-13 RX ADMIN — PROCHLORPERAZINE EDISYLATE PRN MG: 5 INJECTION INTRAMUSCULAR; INTRAVENOUS at 11:21

## 2023-10-13 RX ADMIN — HYDROMORPHONE HYDROCHLORIDE PRN MG: 1 INJECTION, SOLUTION INTRAMUSCULAR; INTRAVENOUS; SUBCUTANEOUS at 14:29

## 2023-10-13 RX ADMIN — DEXTROSE AND SODIUM CHLORIDE SCH MLS/HR: 5; 450 INJECTION, SOLUTION INTRAVENOUS at 01:06

## 2023-10-13 RX ADMIN — PROCHLORPERAZINE EDISYLATE PRN MG: 5 INJECTION INTRAMUSCULAR; INTRAVENOUS at 03:21

## 2023-10-13 RX ADMIN — DEXTROSE AND SODIUM CHLORIDE SCH MLS/HR: 5; 450 INJECTION, SOLUTION INTRAVENOUS at 11:20

## 2023-10-13 RX ADMIN — HYDROMORPHONE HYDROCHLORIDE PRN MG: 1 INJECTION, SOLUTION INTRAMUSCULAR; INTRAVENOUS; SUBCUTANEOUS at 11:22

## 2023-10-13 NOTE — PCM.DS
Discharge Summary


Date of Admission: 


10/12/23 05:22





Date of Discharge: 


10/13/23





Admitting Physician: 


WILNER LONG DO





Consults: 





                                Consults on Case





10/12/23 05:24


Consult Surgery ROUTINE 











Primary Care Provider: 


KARMA LYN








Allergies


Allergies





No Known Drug Allergies Allergy (Verified 10/12/23 02:46)


   











Hospital Summary





- Hospital Course


Hospital Course: 


10/12/23


 is a 73 year old male with hx of Crohn's, HTN, DMII, BPH, HLP presented

to ER with c/o severe left lower abd pain started yesterday. Pain is associated 

with nausea and vomiting. He had 1 BM since yesterday, and now unable to t

olerate any oral foods/meds.


In ER, CT scan shows SBO, and now admitted for this. Has NGT and NPO. General 

surgery has been consulted by ER and will see pt in AM


He has had SBOs in the past - last one 5 yrs ago - and they both resolved on 

their own.


Denies fever. No bloody stools.





10/13/23


Pt resting in bed. He continues to be in quite a bit of pain. Abd. is more 

distended today. NG in place with moderate amount of brown output. Dilaudid 

changed to Q3 hrs PRN. He did not tolerate Morphine so pain medication was 

changed to Dilaudid yesterday. Surgery would like pt tx to [place] for further 

eval by GI - Dr. Rapp. Dr. Lares feels his sxs are r/t his Crohn's. Pt denies

any further concerns than abd pain. 











- Vitals & Intake/Output


Vital Signs: 





                                   Vital Signs











Temperature  98.7 F   10/13/23 11:35


 


Pulse Rate  89   10/13/23 11:35


 


Respiratory Rate  18   10/13/23 11:35


 


Blood Pressure  157/74   10/13/23 11:35


 


O2 Sat by Pulse Oximetry  96   10/13/23 11:35











Intake & Output: 





                                 Intake & Output











 10/11/23 10/12/23 10/13/23 10/14/23





 11:59 11:59 11:59 11:59


 


Intake Total  0 2321 


 


Output Total  400 600 


 


Balance  -400 1721 


 


Weight  102.3 kg  














- Lab


Result Diagrams: 


                                 10/13/23 04:15





                                 10/13/23 04:15


Lab Results-Last 24 Hrs: 





                            Lab Results-Last 24 Hours











  10/12/23 10/12/23 10/12/23 Range/Units





  11:52 11:52 16:33 


 


WBC     (4.0-10.5)  x10^3/uL


 


RBC     (4.1-5.6)  x10^6/uL


 


Hgb     (12.5-18.0)  g/dL


 


Hct     (42-50)  %


 


MCV     ()  fL


 


MCH     (26-32)  pg


 


MCHC     (32-36)  g/dL


 


RDW     (11.5-14.0)  %


 


Plt Count     (150-450)  x10^3/uL


 


MPV     (7.5-11.0)  fL


 


Sodium   141   (137-145)  mmol/L


 


Potassium   4.3   (3.5-5.1)  mmol/L


 


Chloride   101   ()  mmol/L


 


Carbon Dioxide   29   (22-30)  mmol/L


 


Anion Gap   14.4   (5-15)  MEQ/L


 


BUN   20   (9-20)  mg/dL


 


Creatinine   0.75   (0.66-1.25)  mg/dL


 


Estimated GFR   > 60.0   ML/MIN


 


Glucose   152 H   ()  mg/dL


 


POC Glucometer    145 H  (74 to 106)  mg/dL


 


Calcium   10.5 H   (8.4-10.2)  mg/dL


 


Total Bilirubin   2.00 H   (0.2-1.3)  mg/dL


 


AST   48   (17-59)  U/L


 


ALT   57 H   (0-50)  U/L


 


Alkaline Phosphatase   59   ()  U/L


 


Troponin I  < 0.012    (0.000-0.034)  ng/mL


 


Serum Total Protein   7.5   (6.3-8.2)  g/dL


 


Albumin   4.5   (3.5-5.0)  g/dL














  10/12/23 10/13/23 10/13/23 Range/Units





  21:56 04:15 04:15 


 


WBC    7.1  (4.0-10.5)  x10^3/uL


 


RBC    4.51  (4.1-5.6)  x10^6/uL


 


Hgb    14.6  (12.5-18.0)  g/dL


 


Hct    47.1  (42-50)  %


 


MCV    104.4 H  ()  fL


 


MCH    32.4 H  (26-32)  pg


 


MCHC    31.0 L  (32-36)  g/dL


 


RDW    14.6 H  (11.5-14.0)  %


 


Plt Count    265  (150-450)  x10^3/uL


 


MPV    9.2  (7.5-11.0)  fL


 


Sodium   138   (137-145)  mmol/L


 


Potassium   4.3   (3.5-5.1)  mmol/L


 


Chloride   100   ()  mmol/L


 


Carbon Dioxide   30   (22-30)  mmol/L


 


Anion Gap   12.3   (5-15)  MEQ/L


 


BUN   18   (9-20)  mg/dL


 


Creatinine   0.78   (0.66-1.25)  mg/dL


 


Estimated GFR   > 60.0   ML/MIN


 


Glucose   161 H   ()  mg/dL


 


POC Glucometer  142 H    (74 to 106)  mg/dL


 


Calcium   9.9   (8.4-10.2)  mg/dL


 


Total Bilirubin   2.30 H   (0.2-1.3)  mg/dL


 


AST   37   (17-59)  U/L


 


ALT   46   (0-50)  U/L


 


Alkaline Phosphatase   63   ()  U/L


 


Troponin I     (0.000-0.034)  ng/mL


 


Serum Total Protein   7.0   (6.3-8.2)  g/dL


 


Albumin   4.2   (3.5-5.0)  g/dL














  10/13/23 10/13/23 Range/Units





  06:36 11:06 


 


WBC    (4.0-10.5)  x10^3/uL


 


RBC    (4.1-5.6)  x10^6/uL


 


Hgb    (12.5-18.0)  g/dL


 


Hct    (42-50)  %


 


MCV    ()  fL


 


MCH    (26-32)  pg


 


MCHC    (32-36)  g/dL


 


RDW    (11.5-14.0)  %


 


Plt Count    (150-450)  x10^3/uL


 


MPV    (7.5-11.0)  fL


 


Sodium    (137-145)  mmol/L


 


Potassium    (3.5-5.1)  mmol/L


 


Chloride    ()  mmol/L


 


Carbon Dioxide    (22-30)  mmol/L


 


Anion Gap    (5-15)  MEQ/L


 


BUN    (9-20)  mg/dL


 


Creatinine    (0.66-1.25)  mg/dL


 


Estimated GFR    ML/MIN


 


Glucose    ()  mg/dL


 


POC Glucometer  172 H  154 H  (74 to 106)  mg/dL


 


Calcium    (8.4-10.2)  mg/dL


 


Total Bilirubin    (0.2-1.3)  mg/dL


 


AST    (17-59)  U/L


 


ALT    (0-50)  U/L


 


Alkaline Phosphatase    ()  U/L


 


Troponin I    (0.000-0.034)  ng/mL


 


Serum Total Protein    (6.3-8.2)  g/dL


 


Albumin    (3.5-5.0)  g/dL











Micro Results-Entire Visit: 





                                   Accuchecks











Date                           10/13/23


 


Date                           10/13/23


 


Time                           11:35


 


Time                           07:02

















- Radiology Exams


Ordered Rad Exams-Entire Visit: 





                              Radiology Procedures











 Category Date Time Status


 


 ABDOMEN AND PELVIS W/0 CONTRAS [CT] Stat Exams  10/12/23 03:08 Completed


 


 CHEST 1 VIEW (PORTABLE) Stat Exams  10/12/23 05:00 Completed














- Procedures and Test


Procedures and Tests throughout Hospitalization: 





                            Therapy Orders & Screens





10/12/23 06:50


Oxygen NASAL CANNULA 2 lpm 


   Comment: 


   Diagnosis: Small bowel obstruction, abdominal pain














Discharge Exam


General Appearance: mild distress, alert, obese


Neurologic Exam: alert, oriented x 3, cooperative, normal mood/affect, nml 

cerebellar function, sensation nml, No motor deficits


Eye Exam: PERRL, EOMI, eyes nml inspection


Ears, Nose, Throat Exam: normal ENT inspection, pharynx normal, moist mucous 

membranes


Neck Exam: normal inspection, non-tender, supple, full range of motion


Respiratory Exam: normal breath sounds, lungs clear, No respiratory distress


Cardiovascular Exam: regular rate/rhythm, normal heart sounds


Gastrointestinal/Abdomen Exam: tenderness, distention, other (Decreased BS X4), 

No mass


Male Genitalia Exam: deferred


Rectal Exam: deferred


Back Exam: normal inspection, normal range of motion, No CVA tenderness, No 

vertebral tenderness


Extremity Exam: normal inspection, normal range of motion


Skin Exam: normal color, warm, dry





Final Diagnosis/Problem List





- Final Discharge Diagnosis/Problem


(1) Small bowel obstruction


Current Visit: Yes   Status: Acute   


Assessment & Plan: 


-SBO noted on CT scan. 


-NPO and NGT to suction. 


-Surgery to see. -


-Prn morphine for pain


10/13


- Pain medication changed to Dilaudid Q3 PRN


-Surgery recommends tx to [place] for GI eval


Code(s): K56.609 - UNSP INTESTNL OBST, UNSP AS TO PARTIAL VERSUS COMPLETE OBST  

 





(2) Abdominal pain


Current Visit: Yes   Status: Acute   


Assessment & Plan: 


- See SBO


- Narcotic pain control


Code(s): R10.9 - UNSPECIFIED ABDOMINAL PAIN   





(3) Crohn disease


Current Visit: Yes   Status: Acute   


Assessment & Plan: 


-Denies any recent bloody stools. 


-Now here with SBO 


Code(s): K50.90 - CROHN'S DISEASE, UNSPECIFIED, WITHOUT COMPLICATIONS   





(4) Diabetes mellitus, type II


Current Visit: Yes   Status: Acute   


Assessment & Plan: 


- NPO. 


- Starting D5 1/2 NS at 100ml/hr. 


- Accucheck and low dose SSI.


- Uncontrolled


- A1C 6.49 








(5) Essential (primary) hypertension


Current Visit: Yes   Status: Acute   


Assessment & Plan: 


- Stable


- Consider IV meds if needed since NPO


Code(s): I10 - ESSENTIAL (PRIMARY) HYPERTENSION   





(6) Hypercalcemia


Current Visit: Yes   Status: Acute   


Assessment & Plan: 


- resolved


Code(s): E83.52 - HYPERCALCEMIA   





(7) Transaminitis


Current Visit: Yes   Status: Acute   


Assessment & Plan: 


- resolved


Code(s): R74.01 - ELEVATION OF LEVELS OF LIVER TRANSAMINASE LEVELS   





- Discharge


Disposition: Home, Self-Care


Condition: Stable


Prescriptions: 


No Action


   Magnesium Oxide 400 mg*** [Mag-Ox 400***] 400 mg PO TID


   hydroCHLOROthiazide [Hydrochlorothiazide] 12.5 mg PO DAILY


   Metformin HCl [Glucophage] 1,000 mg PO BID


   Amlodipine Besylate 5 mg*** [Norvasc 5 mg***] 5 mg PO BID


   Metoprolol Tartrate 50 mg*** [Lopressor 50 MG***] 50 mg PO BID


   azaTHIOprine [Azathioprine] 100 mg PO BID


   Cholecalciferol (Vitamin D3) [Vitamin D] 400 unit PO DAILY


   Clopidogrel Bisulfate [PLAVIX 75 MG Tablet***] 75 mg PO DAILY


   Insulin Lispro [Humalog] 20 unit SQ BID


   Aspirin [Aspir-Low] 81 mg PO DAILY


   Terazosin HCl 5 mg PO HS


   glipiZIDE [Glipizide] 5 mg PO DAILY


   Tamsulosin HCl 0.4 mg*** [Flomax 0.4 MG***] 0.4 mg PO BID


   Nitroglycerin 0.4 mg Tablet*** [Nitrostat 0.4 MG Tablet***] 0.4 mg SL UD PRN


     PRN Reason: Chest Pain


   Canagliflozin [Invokana] 300 mg PO DAILY


   lisinopriL [Lisinopril] 40 mg PO BID


   PANTOPRAZOLE 40 mg Tablet*** [Protonix 40MG Tablet***] 40 mg PO DAILY


   Atorvastatin Calcium [Lipitor 20MG Tablet] 20 mg PO DAILY


   Pregabalin 150 mg PO BID


   Insulin Degludec [Tresiba Flextouch U-100] 65 unit SQ HS


Follow up with: 


KARMA LYN [Primary Care Provider] -

## 2023-10-13 NOTE — CONS
CONSULT DATE:    10/12/2023



HISTORY OF PRESENT ILLNESS:     Mr. Turcios apparently had his initial episode about 18 
years ago. He thinks he had a surgical resection, probably a partial ileocolectomy. He 
only had 1 operation. He didn't have any additional operations. He had this at [facility]. I mentioned a long list of surgeons including myself and our group, but he did 
not remember the surgeon at all. At that time, the surgical staff was quite constant there 
at [facility]. I am surprised that he could not name the surgeon, but he did not.



He has been under the care of Dr. Hugo Rapp and he is on the medication. I think just 
a traditional Asacol-like drug. He has not been on any biologic. 



He was not having any trouble though about 20 hours ago. He developed acute abdominal pain 
followed by vomiting. He has a nasogastric tube in place now and it is quite foul. It is 
green and a little dark with some brown flecks. It is clearly an obstructing nature on his 
nasogastric. His abdomen is mildly distended and somewhat tympanic. He is not bouncing 
platelets at this time. His plain films suggest small bowel obstruction. He has required 
some pain medication. He does not look toxic. He does have IV fluids started. He is on the 
medical service. He has electrolytes, IV fluids, etc. for that. At this time, I doubt he 
is going to require surgical intervention. He might require a burst of high-dose steroids. 
He certainly has the initial things in place, nasogastric tube, IV fluids, and 
gastrointestinal rest.



PLAN:  We will follow-up medical. His gastrointestinal doctor is in [facility]. If he 
needs extensive medical treatment, he will need transferred there. If he starts to get 
better, he could certainly stay in [place]. He might benefit from a course of steroids, 
but I don't think we have to start them immediately, but if he does not open up or start 
to open up, it is a strong consideration. We will follow along with medical.

## 2023-10-13 NOTE — [email or web address].NOTE
Date and Time: 10/13/23  1214





Subjective Assessment: 


10/12/23


 is a 73 year old male with hx of Crohn's, HTN, DMII, BPH, HLP presented

to ER with c/o severe left lower abd pain started yesterday. Pain is associated 

with nausea and vomiting. He had 1 BM since yesterday, and now unable to 

tolerate any oral foods/meds. In ER, CT scan shows SBO, and now admitted for 

this. Has NGT and NPO. General surgery has been consulted by ER and will see pt 

in AM. He has had SBOs in the past - last one 5 yrs ago - and they both resolved

on their own. Denies fever. No bloody stools.





10/13/23


Pt resting in bed. He continues to have abd. pain. Dilaudid changed to Q3 hours.

Abd. is distended, he has an NG with moderate amount of brown output. Surgery to

see pt again today for further eval and make a decision if surgery is needed. Pt

prefers not to have surgery unless necessary. Pt is irritable today. 








OBJECTIVE DATA


Vital Signs: 


                               Vital Signs - 24 hr











  Temp Pulse Resp BP Pulse Ox


 


 10/13/23 11:35  98.7 F  89  18  157/74  96


 


 10/13/23 08:00   85   


 


 10/13/23 07:02  98.6 F  104 H  20  151/80  95


 


 10/13/23 06:54      95


 


 10/13/23 04:00  97.3 F  86  18  146/67  95


 


 10/12/23 23:41  97.7 F  89  18  142/70  91 L


 


 10/12/23 20:00  97.8 F  91 H  19  141/79  91 L


 


 10/12/23 19:24      94 L


 


 10/12/23 15:43  98.3 F  94 H  18  164/75  94 L








                        Pain Assessment - Last Documented











Pain Intensity                 8


 


Pain Scale Used                0-10 Pain Scale











Intake and Output: 


                                 Intake & Output











 10/11/23 10/12/23 10/13/23 10/14/23





 11:59 11:59 11:59 11:59


 


Intake Total  0 2321 


 


Output Total  400 600 


 


Balance  -400 1721 


 


Weight  102.3 kg  











Lab Results: 


                            Lab Results-Last 24 Hours











  10/12/23 10/12/23 10/12/23 Range/Units





  11:52 11:52 11:52 


 


WBC   8.6   (4.0-10.5)  x10^3/uL


 


RBC   4.60   (4.1-5.6)  x10^6/uL


 


Hgb   14.9   (12.5-18.0)  g/dL


 


Hct   47.6   (42-50)  %


 


MCV   103.5 H   ()  fL


 


MCH   32.4 H   (26-32)  pg


 


MCHC   31.3 L   (32-36)  g/dL


 


RDW   14.3 H   (11.5-14.0)  %


 


Plt Count   260   (150-450)  x10^3/uL


 


MPV   8.8   (7.5-11.0)  fL


 


Sodium    141  (137-145)  mmol/L


 


Potassium    4.3  (3.5-5.1)  mmol/L


 


Chloride    101  ()  mmol/L


 


Carbon Dioxide    29  (22-30)  mmol/L


 


Anion Gap    14.4  (5-15)  MEQ/L


 


BUN    20  (9-20)  mg/dL


 


Creatinine    0.75  (0.66-1.25)  mg/dL


 


Estimated GFR    > 60.0  ML/MIN


 


Glucose    152 H  ()  mg/dL


 


POC Glucometer     (74 to 106)  mg/dL


 


Calcium    10.5 H  (8.4-10.2)  mg/dL


 


Total Bilirubin    2.00 H  (0.2-1.3)  mg/dL


 


AST    48  (17-59)  U/L


 


ALT    57 H  (0-50)  U/L


 


Alkaline Phosphatase    59  ()  U/L


 


Troponin I  < 0.012    (0.000-0.034)  ng/mL


 


Serum Total Protein    7.5  (6.3-8.2)  g/dL


 


Albumin    4.5  (3.5-5.0)  g/dL














  10/12/23 10/12/23 10/13/23 Range/Units





  16:33 21:56 04:15 


 


WBC     (4.0-10.5)  x10^3/uL


 


RBC     (4.1-5.6)  x10^6/uL


 


Hgb     (12.5-18.0)  g/dL


 


Hct     (42-50)  %


 


MCV     ()  fL


 


MCH     (26-32)  pg


 


MCHC     (32-36)  g/dL


 


RDW     (11.5-14.0)  %


 


Plt Count     (150-450)  x10^3/uL


 


MPV     (7.5-11.0)  fL


 


Sodium    138  (137-145)  mmol/L


 


Potassium    4.3  (3.5-5.1)  mmol/L


 


Chloride    100  ()  mmol/L


 


Carbon Dioxide    30  (22-30)  mmol/L


 


Anion Gap    12.3  (5-15)  MEQ/L


 


BUN    18  (9-20)  mg/dL


 


Creatinine    0.78  (0.66-1.25)  mg/dL


 


Estimated GFR    > 60.0  ML/MIN


 


Glucose    161 H  ()  mg/dL


 


POC Glucometer  145 H  142 H   (74 to 106)  mg/dL


 


Calcium    9.9  (8.4-10.2)  mg/dL


 


Total Bilirubin    2.30 H  (0.2-1.3)  mg/dL


 


AST    37  (17-59)  U/L


 


ALT    46  (0-50)  U/L


 


Alkaline Phosphatase    63  ()  U/L


 


Troponin I     (0.000-0.034)  ng/mL


 


Serum Total Protein    7.0  (6.3-8.2)  g/dL


 


Albumin    4.2  (3.5-5.0)  g/dL














  10/13/23 10/13/23 10/13/23 Range/Units





  04:15 06:36 11:06 


 


WBC  7.1    (4.0-10.5)  x10^3/uL


 


RBC  4.51    (4.1-5.6)  x10^6/uL


 


Hgb  14.6    (12.5-18.0)  g/dL


 


Hct  47.1    (42-50)  %


 


MCV  104.4 H    ()  fL


 


MCH  32.4 H    (26-32)  pg


 


MCHC  31.0 L    (32-36)  g/dL


 


RDW  14.6 H    (11.5-14.0)  %


 


Plt Count  265    (150-450)  x10^3/uL


 


MPV  9.2    (7.5-11.0)  fL


 


Sodium     (137-145)  mmol/L


 


Potassium     (3.5-5.1)  mmol/L


 


Chloride     ()  mmol/L


 


Carbon Dioxide     (22-30)  mmol/L


 


Anion Gap     (5-15)  MEQ/L


 


BUN     (9-20)  mg/dL


 


Creatinine     (0.66-1.25)  mg/dL


 


Estimated GFR     ML/MIN


 


Glucose     ()  mg/dL


 


POC Glucometer   172 H  154 H  (74 to 106)  mg/dL


 


Calcium     (8.4-10.2)  mg/dL


 


Total Bilirubin     (0.2-1.3)  mg/dL


 


AST     (17-59)  U/L


 


ALT     (0-50)  U/L


 


Alkaline Phosphatase     ()  U/L


 


Troponin I     (0.000-0.034)  ng/mL


 


Serum Total Protein     (6.3-8.2)  g/dL


 


Albumin     (3.5-5.0)  g/dL











Radiology Exams: 


                              Radiology Procedures











 Category Date Time Status


 


 ABDOMEN AND PELVIS W/0 CONTRAS [CT] Stat Exams  10/12/23 03:08 Completed


 


 CHEST 1 VIEW (PORTABLE) Stat Exams  10/12/23 05:00 Completed











Multi-Disciplinary Progress Notes: 


                        Multi-Disciplinary Progress Notes





10/13/23 10:46 Case Management Note by Yina Maya


S/W PATIENT- HE CONTINUES TO DENY ANY NEW NEEDS AT TIME OF DC. HE PLANS TO DC 

HOME TO HIS PLF AT TIME OF DC. 





Initialized on 10/13/23 10:46 - END OF NOTE